# Patient Record
Sex: FEMALE | Race: OTHER
[De-identification: names, ages, dates, MRNs, and addresses within clinical notes are randomized per-mention and may not be internally consistent; named-entity substitution may affect disease eponyms.]

---

## 2019-07-22 ENCOUNTER — HOSPITAL ENCOUNTER (INPATIENT)
Dept: HOSPITAL 15 - ER | Age: 39
LOS: 3 days | Discharge: HOME | DRG: 243 | End: 2019-07-25
Attending: INTERNAL MEDICINE | Admitting: NURSE PRACTITIONER
Payer: SELF-PAY

## 2019-07-22 VITALS — WEIGHT: 176.15 LBS | HEIGHT: 63 IN | BODY MASS INDEX: 31.21 KG/M2

## 2019-07-22 DIAGNOSIS — I44.2: Primary | ICD-10-CM

## 2019-07-22 DIAGNOSIS — N12: ICD-10-CM

## 2019-07-22 DIAGNOSIS — Z82.49: ICD-10-CM

## 2019-07-22 DIAGNOSIS — I08.0: ICD-10-CM

## 2019-07-22 LAB
ALBUMIN SERPL-MCNC: 3.4 G/DL (ref 3.4–5)
ALCOHOL, URINE: < 3 MG/DL (ref 0–5)
ALP SERPL-CCNC: 49 U/L (ref 45–117)
ALT SERPL-CCNC: 19 U/L (ref 13–56)
AMPHETAMINES UR QL SCN: NEGATIVE
ANION GAP SERPL CALCULATED.3IONS-SCNC: 8 MMOL/L (ref 5–15)
APTT PPP: 24.8 SEC (ref 23.64–32.05)
BARBITURATES UR QL SCN: NEGATIVE
BENZODIAZ UR QL SCN: NEGATIVE
BILIRUB SERPL-MCNC: 0.4 MG/DL (ref 0.2–1)
BUN SERPL-MCNC: 21 MG/DL (ref 7–18)
BUN/CREAT SERPL: 24.4
BZE UR QL SCN: NEGATIVE
CALCIUM SERPL-MCNC: 7.9 MG/DL (ref 8.5–10.1)
CANNABINOIDS UR QL SCN: NEGATIVE
CHLORIDE SERPL-SCNC: 110 MMOL/L (ref 98–107)
CO2 SERPL-SCNC: 23 MMOL/L (ref 21–32)
GLUCOSE SERPL-MCNC: 92 MG/DL (ref 74–106)
HCT VFR BLD AUTO: 39.8 % (ref 36–46)
HGB BLD-MCNC: 13.3 G/DL (ref 12.2–16.2)
INR PPP: 1.04 (ref 0.9–1.15)
MCH RBC QN AUTO: 32.3 PG (ref 28–32)
MCV RBC AUTO: 96.6 FL (ref 80–100)
NRBC BLD QL AUTO: 0.3 %
OPIATES UR QL SCN: NEGATIVE
PCP UR QL SCN: NEGATIVE
POTASSIUM SERPL-SCNC: 3.7 MMOL/L (ref 3.5–5.1)
PROT SERPL-MCNC: 6.7 G/DL (ref 6.4–8.2)
SODIUM SERPL-SCNC: 141 MMOL/L (ref 136–145)

## 2019-07-22 PROCEDURE — 94761 N-INVAS EAR/PLS OXIMETRY MLT: CPT

## 2019-07-22 PROCEDURE — 74176 CT ABD & PELVIS W/O CONTRAST: CPT

## 2019-07-22 PROCEDURE — 71045 X-RAY EXAM CHEST 1 VIEW: CPT

## 2019-07-22 PROCEDURE — 87088 URINE BACTERIA CULTURE: CPT

## 2019-07-22 PROCEDURE — 85025 COMPLETE CBC W/AUTO DIFF WBC: CPT

## 2019-07-22 PROCEDURE — 84702 CHORIONIC GONADOTROPIN TEST: CPT

## 2019-07-22 PROCEDURE — 96366 THER/PROPH/DIAG IV INF ADDON: CPT

## 2019-07-22 PROCEDURE — 80048 BASIC METABOLIC PNL TOTAL CA: CPT

## 2019-07-22 PROCEDURE — 83605 ASSAY OF LACTIC ACID: CPT

## 2019-07-22 PROCEDURE — 93306 TTE W/DOPPLER COMPLETE: CPT

## 2019-07-22 PROCEDURE — 84443 ASSAY THYROID STIM HORMONE: CPT

## 2019-07-22 PROCEDURE — 85610 PROTHROMBIN TIME: CPT

## 2019-07-22 PROCEDURE — 80307 DRUG TEST PRSMV CHEM ANLYZR: CPT

## 2019-07-22 PROCEDURE — 51702 INSERT TEMP BLADDER CATH: CPT

## 2019-07-22 PROCEDURE — 84484 ASSAY OF TROPONIN QUANT: CPT

## 2019-07-22 PROCEDURE — 83735 ASSAY OF MAGNESIUM: CPT

## 2019-07-22 PROCEDURE — 82533 TOTAL CORTISOL: CPT

## 2019-07-22 PROCEDURE — 84100 ASSAY OF PHOSPHORUS: CPT

## 2019-07-22 PROCEDURE — 81025 URINE PREGNANCY TEST: CPT

## 2019-07-22 PROCEDURE — 87081 CULTURE SCREEN ONLY: CPT

## 2019-07-22 PROCEDURE — 87186 SC STD MICRODIL/AGAR DIL: CPT

## 2019-07-22 PROCEDURE — 87040 BLOOD CULTURE FOR BACTERIA: CPT

## 2019-07-22 PROCEDURE — 36415 COLL VENOUS BLD VENIPUNCTURE: CPT

## 2019-07-22 PROCEDURE — 80053 COMPREHEN METABOLIC PANEL: CPT

## 2019-07-22 PROCEDURE — 93005 ELECTROCARDIOGRAM TRACING: CPT

## 2019-07-22 PROCEDURE — 84439 ASSAY OF FREE THYROXINE: CPT

## 2019-07-22 PROCEDURE — 85730 THROMBOPLASTIN TIME PARTIAL: CPT

## 2019-07-22 PROCEDURE — 96365 THER/PROPH/DIAG IV INF INIT: CPT

## 2019-07-22 PROCEDURE — 81001 URINALYSIS AUTO W/SCOPE: CPT

## 2019-07-22 PROCEDURE — 87086 URINE CULTURE/COLONY COUNT: CPT

## 2019-07-22 RX ADMIN — SODIUM CHLORIDE SCH MLS/HR: 0.9 INJECTION, SOLUTION INTRAVENOUS at 20:16

## 2019-07-22 RX ADMIN — ACETAMINOPHEN PRN MG: 500 TABLET ORAL at 20:00

## 2019-07-22 RX ADMIN — DOPAMINE HYDROCHLORIDE IN DEXTROSE SCH MLS/HR: 1.6 INJECTION, SOLUTION INTRAVENOUS at 18:52

## 2019-07-22 RX ADMIN — DOPAMINE HYDROCHLORIDE IN DEXTROSE SCH MLS/HR: 1.6 INJECTION, SOLUTION INTRAVENOUS at 08:00

## 2019-07-22 RX ADMIN — ACETAMINOPHEN PRN MG: 500 TABLET ORAL at 14:15

## 2019-07-22 RX ADMIN — SODIUM CHLORIDE SCH MLS/HR: 0.9 INJECTION, SOLUTION INTRAVENOUS at 08:19

## 2019-07-22 RX ADMIN — SODIUM CHLORIDE SCH MLS/HR: 0.9 INJECTION, SOLUTION INTRAVENOUS at 18:52

## 2019-07-23 VITALS — SYSTOLIC BLOOD PRESSURE: 109 MMHG | DIASTOLIC BLOOD PRESSURE: 51 MMHG

## 2019-07-23 LAB
ANION GAP SERPL CALCULATED.3IONS-SCNC: 7 MMOL/L (ref 5–15)
BUN SERPL-MCNC: 16 MG/DL (ref 7–18)
BUN/CREAT SERPL: 21.9
CALCIUM SERPL-MCNC: 8 MG/DL (ref 8.5–10.1)
CHLORIDE SERPL-SCNC: 113 MMOL/L (ref 98–107)
CO2 SERPL-SCNC: 24 MMOL/L (ref 21–32)
GLUCOSE SERPL-MCNC: 122 MG/DL (ref 74–106)
HCT VFR BLD AUTO: 42.7 % (ref 36–46)
HGB BLD-MCNC: 14.3 G/DL (ref 12.2–16.2)
MCH RBC QN AUTO: 32.4 PG (ref 28–32)
MCV RBC AUTO: 96.5 FL (ref 80–100)
NRBC BLD QL AUTO: 0.1 %
POTASSIUM SERPL-SCNC: 3.9 MMOL/L (ref 3.5–5.1)
SODIUM SERPL-SCNC: 144 MMOL/L (ref 136–145)

## 2019-07-23 RX ADMIN — SODIUM CHLORIDE SCH MLS/HR: 0.9 INJECTION, SOLUTION INTRAVENOUS at 14:04

## 2019-07-23 RX ADMIN — CEFTRIAXONE SODIUM SCH MLS/HR: 1 INJECTION, POWDER, FOR SOLUTION INTRAMUSCULAR; INTRAVENOUS at 21:00

## 2019-07-23 RX ADMIN — CEFTRIAXONE SODIUM SCH MLS/HR: 1 INJECTION, POWDER, FOR SOLUTION INTRAMUSCULAR; INTRAVENOUS at 09:07

## 2019-07-23 RX ADMIN — DOPAMINE HYDROCHLORIDE IN DEXTROSE SCH MLS/HR: 1.6 INJECTION, SOLUTION INTRAVENOUS at 19:14

## 2019-07-23 NOTE — NUR
Admit to [facility]

STEPH BAKER admitted to [facility] via ER BED on cardiac monitor. Patient connected to unit 
monitoring and oxygen, and weighed by bedscale. Patient oriented to DALLAS MARINELLI, 
primary RN, unit, room, bed, and unit policies regarding patient care and visiting hours.  
All questions and concerns addressed, patient verbalized understanding.

## 2019-07-24 VITALS — SYSTOLIC BLOOD PRESSURE: 100 MMHG | DIASTOLIC BLOOD PRESSURE: 53 MMHG

## 2019-07-24 VITALS — DIASTOLIC BLOOD PRESSURE: 52 MMHG | SYSTOLIC BLOOD PRESSURE: 106 MMHG

## 2019-07-24 VITALS — SYSTOLIC BLOOD PRESSURE: 108 MMHG | DIASTOLIC BLOOD PRESSURE: 63 MMHG

## 2019-07-24 VITALS — SYSTOLIC BLOOD PRESSURE: 114 MMHG | DIASTOLIC BLOOD PRESSURE: 60 MMHG

## 2019-07-24 VITALS — SYSTOLIC BLOOD PRESSURE: 101 MMHG | DIASTOLIC BLOOD PRESSURE: 44 MMHG

## 2019-07-24 VITALS — SYSTOLIC BLOOD PRESSURE: 113 MMHG | DIASTOLIC BLOOD PRESSURE: 54 MMHG

## 2019-07-24 LAB
ANION GAP SERPL CALCULATED.3IONS-SCNC: 8 MMOL/L (ref 5–15)
BUN SERPL-MCNC: 16 MG/DL (ref 7–18)
BUN/CREAT SERPL: 22.9
CALCIUM SERPL-MCNC: 7.7 MG/DL (ref 8.5–10.1)
CHLORIDE SERPL-SCNC: 114 MMOL/L (ref 98–107)
CO2 SERPL-SCNC: 22 MMOL/L (ref 21–32)
GLUCOSE SERPL-MCNC: 79 MG/DL (ref 74–106)
MAGNESIUM SERPL-MCNC: 2.2 MG/DL (ref 1.6–2.6)
POTASSIUM SERPL-SCNC: 3.5 MMOL/L (ref 3.5–5.1)
SODIUM SERPL-SCNC: 144 MMOL/L (ref 136–145)

## 2019-07-24 PROCEDURE — 0JH606Z INSERTION OF PACEMAKER, DUAL CHAMBER INTO CHEST SUBCUTANEOUS TISSUE AND FASCIA, OPEN APPROACH: ICD-10-PCS | Performed by: INTERNAL MEDICINE

## 2019-07-24 PROCEDURE — B5171ZZ FLUOROSCOPY OF LEFT SUBCLAVIAN VEIN USING LOW OSMOLAR CONTRAST: ICD-10-PCS | Performed by: INTERNAL MEDICINE

## 2019-07-24 PROCEDURE — 02HK3JZ INSERTION OF PACEMAKER LEAD INTO RIGHT VENTRICLE, PERCUTANEOUS APPROACH: ICD-10-PCS | Performed by: INTERNAL MEDICINE

## 2019-07-24 PROCEDURE — 02H63JZ INSERTION OF PACEMAKER LEAD INTO RIGHT ATRIUM, PERCUTANEOUS APPROACH: ICD-10-PCS | Performed by: INTERNAL MEDICINE

## 2019-07-24 RX ADMIN — CEFTRIAXONE SODIUM SCH MLS/HR: 1 INJECTION, POWDER, FOR SOLUTION INTRAMUSCULAR; INTRAVENOUS at 09:00

## 2019-07-24 RX ADMIN — ACETAMINOPHEN PRN MG: 500 TABLET ORAL at 10:50

## 2019-07-24 RX ADMIN — HYDROCODONE BITARTRATE AND ACETAMINOPHEN PRN TAB: 5; 325 TABLET ORAL at 18:41

## 2019-07-24 RX ADMIN — SODIUM CHLORIDE SCH MLS/HR: 0.9 INJECTION, SOLUTION INTRAVENOUS at 09:00

## 2019-07-24 RX ADMIN — DOPAMINE HYDROCHLORIDE IN DEXTROSE SCH MLS/HR: 1.6 INJECTION, SOLUTION INTRAVENOUS at 12:51

## 2019-07-24 RX ADMIN — SODIUM CHLORIDE SCH MLS/HR: 0.9 INJECTION, SOLUTION INTRAVENOUS at 00:05

## 2019-07-24 RX ADMIN — CEFTRIAXONE SODIUM SCH MLS/HR: 1 INJECTION, POWDER, FOR SOLUTION INTRAMUSCULAR; INTRAVENOUS at 21:15

## 2019-07-24 RX ADMIN — HYDROCODONE BITARTRATE AND ACETAMINOPHEN PRN TAB: 5; 325 TABLET ORAL at 23:00

## 2019-07-24 RX ADMIN — HYDROCODONE BITARTRATE AND ACETAMINOPHEN PRN TAB: 5; 325 TABLET ORAL at 14:43

## 2019-07-24 NOTE — NUR
PATIENT SEMI FOWLERS IN BED, O2 BY R/A,  A/O TIMES 4, ASKING  FOR PAIN MEDICATIONS EXPRESS 
TO HER THAT I WILL GIVE IT WHEN IT IS DUE, TRIED TO  FLUSH THE SALINE LOCK IN THE LFA AND 
PATIENT YELLED AND TOLD ME TO STOP THAT IT HURT TOO MUCH PLACED ICE PACK TO THE  ARM, STOP 
THE NS THAT WAS RUNNING IN THE RAC CHANGED TO SALINE LOCK, O2 BY R/A ,DRESSING TO THE LEFT 
SHOULDER WITH THE SAME SPOT OF BLOOD AND ICE PACK TO THE SITE, WILL CONTINUE TO MONITOR AND 
GIVER REPORT TO THE NEXT SHIFT

## 2019-07-24 NOTE — NUR
back from the cath lab after  pacemaker placed to the left shoulder, dressing with a small  
amount of blood

## 2019-07-24 NOTE — NUR
STATES SHE IS STARTING TO FEEL BETTER AND DRANK SOME JUICE, RESTATED THE IV FLUIDS AND 
STATING NOT HURTING

## 2019-07-24 NOTE — NUR
MORNING CARE 

PATIENT PERFORMED MORNING CARE ON SELF WITH CHG WIPES AND WASH CLOTHS TO THE FACE. FULL 
LINEN CHANGE AND GOWN CHANGED. SKIN REASSESSED AT THIS TIME. BED IN LOWEST POSITION, SIDE 
RAILS UP X2, CALL LIGHT WITHIN REACH. WILL CONTINUE TO MONITOR.

## 2019-07-24 NOTE — NUR
IV REMOVAL

PATIENT COMPLAINING OF BURNING AND PAIN DURING FLUSH ON LEFT FOREARM IV. IV REMOVED, 
CATHETER FULLY INTACT. PATIENT TOLERATED WELL.

## 2019-07-24 NOTE — NUR
RECEIVED PATIENT AWAKE A/O TIMES 4, O2 BY R/A, STATES SHE GOES TO THE BR, NS INFUSING INTO 
THE RAC AT 100ML/HR BY THE IV PUMP, DENIES CHEST PAIN AND SOB, STATED THAT SHE WAS GOING TO 
HAVE A PACEMAKER PLACED TODAY, INFORMED HER THAT I WOULD CALL AND SEE WHAT TIME

## 2019-07-24 NOTE — NUR
SPOKE WITH DR STARR AND STATED TO TELL THE PATIENT THAT SHE WOULD NOT BE HAVING PACEMAKER 
PLACED TODAY THE CATH LAB IS FULL, MADE PATIETN AWARE

## 2019-07-24 NOTE — NUR
PATIENT STATES SHE HAS A FUNNY FEELING AND HER ARM IS HURTING, POTASSIUM RATE DECREASED TO 
HALF  15ML/HR

## 2019-07-24 NOTE — NUR
END OF SHIFT 

PATIENT IN BED SLEEPING WITH NO SIGNS OR [id number] OF SOB, PAIN OR DISTRESS. CURRENTLY ON 
ROOM AIR, 02 SAT - 99%. RIGHT ANTECUBITAL IV - CLEAN/DRY/INTACT. BED IN LOWEST POSITION, 
SIDE RAILS UP X2, CALL LIGHT WITHIN REACH. WILL ENDORSE CARE TO DAY SHIFT RN.

## 2019-07-24 NOTE — NUR
STATES ARM IS STILL HURTING AND SHE FEELING LIKE SHE IS GOING TO  PASS OUT, FLUSHED THE SITE 
AND STOPPED THE POTASSIUM, EXPRESS TO HER TO TRY AND RELAX

## 2019-07-24 NOTE — NUR
OPENING SHIFT

RECEIVED REPORT FROM DAY SHIFT RN. ASSUMED CARE OF PATIENT. PATIENT IN BED RESTING RESTING 
WITH NO SIGNS OR SYMPTOMS OF SOB, PAIN OR DISTRESS. CURRENTLY ON ROOM AIR, 02 SAT - 99%. 
LEFT UPPER CHEST S/P PERMANENT PACEMAKER DRESSING - CLEAN/DRY/INTACT. RIGHT AND LEFT FOREARM 
IV - CLEAN/DRY/INTACT. REPOSITIONED FOR COMFORT. UPDATED PATIENT ON PLAN OF CARE. BED IN 
LOWEST POSITION, SIDE RAILS UP X2, CALL LIGHT WITHIN REACH. WILL CONTINUE TO MONITOR.

## 2019-07-25 VITALS — DIASTOLIC BLOOD PRESSURE: 59 MMHG | SYSTOLIC BLOOD PRESSURE: 111 MMHG

## 2019-07-25 VITALS — DIASTOLIC BLOOD PRESSURE: 57 MMHG | SYSTOLIC BLOOD PRESSURE: 98 MMHG

## 2019-07-25 VITALS — SYSTOLIC BLOOD PRESSURE: 105 MMHG | DIASTOLIC BLOOD PRESSURE: 60 MMHG

## 2019-07-25 VITALS — DIASTOLIC BLOOD PRESSURE: 66 MMHG | SYSTOLIC BLOOD PRESSURE: 104 MMHG

## 2019-07-25 VITALS — SYSTOLIC BLOOD PRESSURE: 103 MMHG | DIASTOLIC BLOOD PRESSURE: 53 MMHG

## 2019-07-25 VITALS — SYSTOLIC BLOOD PRESSURE: 111 MMHG | DIASTOLIC BLOOD PRESSURE: 59 MMHG

## 2019-07-25 LAB
ANION GAP SERPL CALCULATED.3IONS-SCNC: 12 MMOL/L (ref 5–15)
BUN SERPL-MCNC: 11 MG/DL (ref 7–18)
BUN/CREAT SERPL: 17.2
CALCIUM SERPL-MCNC: 7.7 MG/DL (ref 8.5–10.1)
CHLORIDE SERPL-SCNC: 113 MMOL/L (ref 98–107)
CO2 SERPL-SCNC: 19 MMOL/L (ref 21–32)
GLUCOSE SERPL-MCNC: 62 MG/DL (ref 74–106)
POTASSIUM SERPL-SCNC: 4.7 MMOL/L (ref 3.5–5.1)
SODIUM SERPL-SCNC: 144 MMOL/L (ref 136–145)

## 2019-07-25 RX ADMIN — CEFTRIAXONE SODIUM SCH MLS/HR: 1 INJECTION, POWDER, FOR SOLUTION INTRAMUSCULAR; INTRAVENOUS at 08:27

## 2019-07-25 RX ADMIN — HYDROCODONE BITARTRATE AND ACETAMINOPHEN PRN TAB: 5; 325 TABLET ORAL at 08:27

## 2019-07-25 RX ADMIN — HYDROCODONE BITARTRATE AND ACETAMINOPHEN PRN TAB: 5; 325 TABLET ORAL at 12:50

## 2019-07-25 RX ADMIN — HYDROCODONE BITARTRATE AND ACETAMINOPHEN PRN TAB: 5; 325 TABLET ORAL at 03:56

## 2019-07-25 RX ADMIN — ACETAMINOPHEN PRN MG: 500 TABLET ORAL at 16:20

## 2019-07-25 NOTE — NUR
END OF SHIFT

PATIENT IN BED RESTING RESTING WITH NO SIGNS OR SYMPTOMS OF SOB, PAIN OR DISTRESS. CURRENTLY 
ON ROOM AIR, 02 SAT - 99%. LEFT UPPER CHEST S/P PERMANENT PACEMAKER DRESSING - 
CLEAN/DRY/INTACT. RIGHT FOREARM IV - CLEAN/DRY/INTACT. REPOSITIONED FOR COMFORT. BED IN 
LOWEST POSITION, SIDE RAILS UP X2, CALL LIGHT WITHIN REACH. WILL ENDORSE CARE TO DAY SHIFT 
RN.

## 2019-07-25 NOTE — NUR
MOM HERE TO TAKE PATIENT HOME,  SALINE LOCK REMOVED FROM THE RAC 20G, INTACT, SLING TO THE 
LEFT ARM AND SMALL AMOUNT OF BLOOD TO THE CHEST DRESSING, REMINDED THE PATIENT NOT TO RAISE 
THE ARM OVER HER HEAD AND NOT TO  LIFT ANY THING HEAVY,    AND TO KEEP DRESSING DRY,  TAKEN 
TO  PRIVATE CAR FOR TRANSPORT HOME

## 2019-07-25 NOTE — NUR
PAGED SUMMER LOPEZ TO COME AND SEE THE PATIENT STATES SHE IS HAVING A FLUTTERY LIKE FEELING IN 
HER CHEST

## 2019-07-25 NOTE — NUR
SITTING UP IN THE BED TALKING ON THE CELL PHONE, MOTHER AT THE BEDSIDE, SLING TO THE LEFT 
ARM, AND ICE PACK

## 2019-07-25 NOTE — NUR
DR MIDDLETON IN TO SE THE PATIENT AND STATED SHE CAN GO HOME

-------------------------------------------------------------------------------

Addendum: 07/25/19 at 1415 by Jena Fitzpatrick RN

-------------------------------------------------------------------------------

CHANGE TIME TO 6271

## 2019-07-25 NOTE — NUR
RECEIVED PATIENT SITTING UP IN TH BED A/O TIMES4. O2 BY R/A, STATES SHE GOT UP TOT HE BSC 
DURING THE NIGHT, SALINE LOCK TO THE RAC 20G FLUSHED AND PATENT, DRESSING TO THE LEFT 
SHOULDER WITH SMALL AMOUNT OF DRIED BLOOD, AND ICE PACK, SLING TO THE LEFT ARM  DUE TO 
PACEMAKER INSERTION YESTERDAY

## 2019-07-25 NOTE — NUR
D/C INSTRUCTIONS GIVEN TO THE  PATIENT ALONG WITH PRESCRIPTION, EXPRESS TO THE PATIENT THAT 
THEY WILL SENT  A MACHINE TO HER HOUSE IN A FEW DAYS THAT SHE WILL CONNECT HER PACEMAKER TO 
AT NIGHT, VERBALIZED THAT SHE UNDERSTOOD

## 2019-07-26 ENCOUNTER — HOSPITAL ENCOUNTER (EMERGENCY)
Dept: HOSPITAL 15 - ER | Age: 39
Discharge: HOME | End: 2019-07-26
Payer: SELF-PAY

## 2019-07-26 VITALS — DIASTOLIC BLOOD PRESSURE: 60 MMHG | SYSTOLIC BLOOD PRESSURE: 112 MMHG

## 2019-07-26 VITALS — WEIGHT: 168 LBS | HEIGHT: 63 IN | BODY MASS INDEX: 29.77 KG/M2

## 2019-07-26 DIAGNOSIS — R55: Primary | ICD-10-CM

## 2019-07-26 DIAGNOSIS — Z95.0: ICD-10-CM

## 2019-07-26 LAB
ALBUMIN SERPL-MCNC: 3.7 G/DL (ref 3.4–5)
ALP SERPL-CCNC: 65 U/L (ref 45–117)
ALT SERPL-CCNC: 27 U/L (ref 13–56)
ANION GAP SERPL CALCULATED.3IONS-SCNC: 8 MMOL/L (ref 5–15)
BILIRUB SERPL-MCNC: 0.6 MG/DL (ref 0.2–1)
BUN SERPL-MCNC: 11 MG/DL (ref 7–18)
BUN/CREAT SERPL: 13.8
CALCIUM SERPL-MCNC: 8.7 MG/DL (ref 8.5–10.1)
CHLORIDE SERPL-SCNC: 107 MMOL/L (ref 98–107)
CO2 SERPL-SCNC: 26 MMOL/L (ref 21–32)
GLUCOSE SERPL-MCNC: 93 MG/DL (ref 74–106)
HCT VFR BLD AUTO: 46.5 % (ref 36–46)
HGB BLD-MCNC: 15.8 G/DL (ref 12.2–16.2)
MCH RBC QN AUTO: 32.5 PG (ref 28–32)
MCV RBC AUTO: 95.8 FL (ref 80–100)
NRBC BLD QL AUTO: 0.1 %
POTASSIUM SERPL-SCNC: 4.1 MMOL/L (ref 3.5–5.1)
PROT SERPL-MCNC: 7.7 G/DL (ref 6.4–8.2)
SODIUM SERPL-SCNC: 141 MMOL/L (ref 136–145)

## 2019-07-26 PROCEDURE — 85025 COMPLETE CBC W/AUTO DIFF WBC: CPT

## 2019-07-26 PROCEDURE — 80053 COMPREHEN METABOLIC PANEL: CPT

## 2019-07-26 PROCEDURE — 93005 ELECTROCARDIOGRAM TRACING: CPT

## 2019-07-26 PROCEDURE — 70450 CT HEAD/BRAIN W/O DYE: CPT

## 2019-07-26 PROCEDURE — 36415 COLL VENOUS BLD VENIPUNCTURE: CPT

## 2019-07-26 PROCEDURE — 94761 N-INVAS EAR/PLS OXIMETRY MLT: CPT

## 2019-07-26 PROCEDURE — 84484 ASSAY OF TROPONIN QUANT: CPT

## 2021-08-03 ENCOUNTER — HOSPITAL ENCOUNTER (EMERGENCY)
Dept: HOSPITAL 15 - ER | Age: 41
Discharge: HOME | End: 2021-08-03
Payer: COMMERCIAL

## 2021-08-03 VITALS — BODY MASS INDEX: 31.89 KG/M2 | WEIGHT: 180 LBS | HEIGHT: 63 IN

## 2021-08-03 VITALS — SYSTOLIC BLOOD PRESSURE: 130 MMHG | DIASTOLIC BLOOD PRESSURE: 77 MMHG

## 2021-08-03 DIAGNOSIS — J18.9: ICD-10-CM

## 2021-08-03 DIAGNOSIS — Z20.822: ICD-10-CM

## 2021-08-03 DIAGNOSIS — Z95.0: ICD-10-CM

## 2021-08-03 DIAGNOSIS — J20.9: Primary | ICD-10-CM

## 2021-08-03 DIAGNOSIS — Z88.6: ICD-10-CM

## 2021-08-03 LAB
ALBUMIN SERPL-MCNC: 3.6 G/DL (ref 3.4–5)
ALP SERPL-CCNC: 83 U/L (ref 45–117)
ALT SERPL-CCNC: 29 U/L (ref 13–56)
ANION GAP SERPL CALCULATED.3IONS-SCNC: 5 MMOL/L (ref 5–15)
BILIRUB SERPL-MCNC: 0.3 MG/DL (ref 0.2–1)
BUN SERPL-MCNC: 11 MG/DL (ref 7–18)
BUN/CREAT SERPL: 15.1
CALCIUM SERPL-MCNC: 8.5 MG/DL (ref 8.5–10.1)
CHLORIDE SERPL-SCNC: 109 MMOL/L (ref 98–107)
CO2 SERPL-SCNC: 25 MMOL/L (ref 21–32)
GLUCOSE SERPL-MCNC: 91 MG/DL (ref 74–106)
HCT VFR BLD AUTO: 47 % (ref 36–46)
HGB BLD-MCNC: 16.4 G/DL (ref 12.2–16.2)
MCH RBC QN AUTO: 31.6 PG (ref 28–32)
MCV RBC AUTO: 90.8 FL (ref 80–100)
NRBC BLD QL AUTO: 0.1 %
POTASSIUM SERPL-SCNC: 3.6 MMOL/L (ref 3.5–5.1)
PROT SERPL-MCNC: 8.7 G/DL (ref 6.4–8.2)
SODIUM SERPL-SCNC: 139 MMOL/L (ref 136–145)

## 2021-08-03 PROCEDURE — 87426 SARSCOV CORONAVIRUS AG IA: CPT

## 2021-08-03 PROCEDURE — 85025 COMPLETE CBC W/AUTO DIFF WBC: CPT

## 2021-08-03 PROCEDURE — 85379 FIBRIN DEGRADATION QUANT: CPT

## 2021-08-03 PROCEDURE — 86141 C-REACTIVE PROTEIN HS: CPT

## 2021-08-03 PROCEDURE — 99285 EMERGENCY DEPT VISIT HI MDM: CPT

## 2021-08-03 PROCEDURE — 83605 ASSAY OF LACTIC ACID: CPT

## 2021-08-03 PROCEDURE — 87040 BLOOD CULTURE FOR BACTERIA: CPT

## 2021-08-03 PROCEDURE — 82728 ASSAY OF FERRITIN: CPT

## 2021-08-03 PROCEDURE — 80053 COMPREHEN METABOLIC PANEL: CPT

## 2021-08-03 PROCEDURE — 71275 CT ANGIOGRAPHY CHEST: CPT

## 2021-08-03 PROCEDURE — 36415 COLL VENOUS BLD VENIPUNCTURE: CPT

## 2021-08-03 PROCEDURE — 71045 X-RAY EXAM CHEST 1 VIEW: CPT

## 2022-07-23 ENCOUNTER — HOSPITAL ENCOUNTER (EMERGENCY)
Dept: HOSPITAL 15 - ER | Age: 42
Discharge: HOME | End: 2022-07-23
Payer: COMMERCIAL

## 2022-07-23 VITALS — WEIGHT: 160.94 LBS | BODY MASS INDEX: 25.86 KG/M2 | HEIGHT: 66 IN

## 2022-07-23 VITALS — DIASTOLIC BLOOD PRESSURE: 81 MMHG | SYSTOLIC BLOOD PRESSURE: 120 MMHG

## 2022-07-23 DIAGNOSIS — Z95.0: ICD-10-CM

## 2022-07-23 DIAGNOSIS — N83.202: Primary | ICD-10-CM

## 2022-07-23 LAB
ALBUMIN SERPL-MCNC: 4.3 G/DL (ref 3.4–5)
ALP SERPL-CCNC: 67 U/L (ref 45–117)
ALT SERPL-CCNC: 22 U/L (ref 13–56)
ANION GAP SERPL CALCULATED.3IONS-SCNC: 8 MMOL/L (ref 5–15)
BILIRUB SERPL-MCNC: 0.4 MG/DL (ref 0.2–1)
BUN SERPL-MCNC: 12 MG/DL (ref 7–18)
BUN/CREAT SERPL: 15.8
CALCIUM SERPL-MCNC: 8.5 MG/DL (ref 8.5–10.1)
CHLORIDE SERPL-SCNC: 108 MMOL/L (ref 98–107)
CO2 SERPL-SCNC: 25 MMOL/L (ref 21–32)
GLUCOSE SERPL-MCNC: 95 MG/DL (ref 74–106)
HCT VFR BLD AUTO: 42.4 % (ref 36–46)
HGB BLD-MCNC: 14.2 G/DL (ref 12.2–16.2)
MAGNESIUM SERPL-MCNC: 2.1 MG/DL (ref 1.6–2.6)
MCH RBC QN AUTO: 30.6 PG (ref 28–32)
MCV RBC AUTO: 91.1 FL (ref 80–100)
NRBC BLD QL AUTO: 0.2 %
POTASSIUM SERPL-SCNC: 4.3 MMOL/L (ref 3.5–5.1)
PROT SERPL-MCNC: 7.7 G/DL (ref 6.4–8.2)
SODIUM SERPL-SCNC: 141 MMOL/L (ref 136–145)

## 2022-07-23 PROCEDURE — 80053 COMPREHEN METABOLIC PANEL: CPT

## 2022-07-23 PROCEDURE — 93005 ELECTROCARDIOGRAM TRACING: CPT

## 2022-07-23 PROCEDURE — 83735 ASSAY OF MAGNESIUM: CPT

## 2022-07-23 PROCEDURE — 74176 CT ABD & PELVIS W/O CONTRAST: CPT

## 2022-07-23 PROCEDURE — 84484 ASSAY OF TROPONIN QUANT: CPT

## 2022-07-23 PROCEDURE — 84702 CHORIONIC GONADOTROPIN TEST: CPT

## 2022-07-23 PROCEDURE — 36415 COLL VENOUS BLD VENIPUNCTURE: CPT

## 2022-07-23 PROCEDURE — 81001 URINALYSIS AUTO W/SCOPE: CPT

## 2022-07-23 PROCEDURE — 85025 COMPLETE CBC W/AUTO DIFF WBC: CPT

## 2022-09-22 ENCOUNTER — HOSPITAL ENCOUNTER (EMERGENCY)
Dept: HOSPITAL 15 - ER | Age: 42
Discharge: HOME | End: 2022-09-22
Payer: COMMERCIAL

## 2022-09-22 VITALS — HEIGHT: 63 IN | BODY MASS INDEX: 33.83 KG/M2 | WEIGHT: 190.92 LBS

## 2022-09-22 VITALS — SYSTOLIC BLOOD PRESSURE: 117 MMHG | DIASTOLIC BLOOD PRESSURE: 78 MMHG

## 2022-09-22 DIAGNOSIS — Z95.0: ICD-10-CM

## 2022-09-22 DIAGNOSIS — J03.90: ICD-10-CM

## 2022-09-22 DIAGNOSIS — K21.9: Primary | ICD-10-CM

## 2022-09-22 DIAGNOSIS — Z88.8: ICD-10-CM

## 2022-09-22 PROCEDURE — 93005 ELECTROCARDIOGRAM TRACING: CPT

## 2022-12-22 ENCOUNTER — HOSPITAL ENCOUNTER (EMERGENCY)
Dept: HOSPITAL 15 - ER | Age: 42
Discharge: HOME | End: 2022-12-22
Payer: COMMERCIAL

## 2022-12-22 VITALS — HEIGHT: 63 IN | BODY MASS INDEX: 33.2 KG/M2 | WEIGHT: 187.39 LBS

## 2022-12-22 VITALS — DIASTOLIC BLOOD PRESSURE: 92 MMHG | SYSTOLIC BLOOD PRESSURE: 130 MMHG

## 2022-12-22 DIAGNOSIS — K21.9: Primary | ICD-10-CM

## 2022-12-22 DIAGNOSIS — Z79.2: ICD-10-CM

## 2022-12-22 DIAGNOSIS — Z79.899: ICD-10-CM

## 2022-12-22 DIAGNOSIS — Z88.8: ICD-10-CM

## 2022-12-22 DIAGNOSIS — K29.70: ICD-10-CM

## 2022-12-22 DIAGNOSIS — Z95.0: ICD-10-CM

## 2022-12-22 LAB
ALBUMIN SERPL-MCNC: 3.8 G/DL (ref 3.4–5)
ALP SERPL-CCNC: 79 U/L (ref 45–117)
ALT SERPL-CCNC: 24 U/L (ref 13–56)
ANION GAP SERPL CALCULATED.3IONS-SCNC: 7 MMOL/L (ref 5–15)
BILIRUB SERPL-MCNC: 0.3 MG/DL (ref 0.2–1)
BUN SERPL-MCNC: 13 MG/DL (ref 7–18)
BUN/CREAT SERPL: 19.4
CALCIUM SERPL-MCNC: 9.3 MG/DL (ref 8.5–10.1)
CHLORIDE SERPL-SCNC: 110 MMOL/L (ref 98–107)
CO2 SERPL-SCNC: 25 MMOL/L (ref 21–32)
GLUCOSE SERPL-MCNC: 108 MG/DL (ref 74–106)
HCT VFR BLD AUTO: 44.6 % (ref 36–46)
HGB BLD-MCNC: 15.2 G/DL (ref 12.2–16.2)
LIPASE SERPL-CCNC: 146 U/L (ref 73–393)
MCH RBC QN AUTO: 30.8 PG (ref 28–32)
MCV RBC AUTO: 90.4 FL (ref 80–100)
NRBC BLD QL AUTO: 0.1 %
POTASSIUM SERPL-SCNC: 4.1 MMOL/L (ref 3.5–5.1)
PROT SERPL-MCNC: 7.6 G/DL (ref 6.4–8.2)
SODIUM SERPL-SCNC: 142 MMOL/L (ref 136–145)

## 2022-12-22 PROCEDURE — 80053 COMPREHEN METABOLIC PANEL: CPT

## 2022-12-22 PROCEDURE — 81025 URINE PREGNANCY TEST: CPT

## 2022-12-22 PROCEDURE — 83690 ASSAY OF LIPASE: CPT

## 2022-12-22 PROCEDURE — 85025 COMPLETE CBC W/AUTO DIFF WBC: CPT

## 2022-12-22 PROCEDURE — 36415 COLL VENOUS BLD VENIPUNCTURE: CPT

## 2022-12-22 PROCEDURE — 81003 URINALYSIS AUTO W/O SCOPE: CPT

## 2022-12-22 PROCEDURE — 74176 CT ABD & PELVIS W/O CONTRAST: CPT

## 2023-03-12 ENCOUNTER — HOSPITAL ENCOUNTER (EMERGENCY)
Dept: HOSPITAL 15 - ER | Age: 43
Discharge: HOME | End: 2023-03-12
Payer: COMMERCIAL

## 2023-03-12 VITALS — SYSTOLIC BLOOD PRESSURE: 122 MMHG | DIASTOLIC BLOOD PRESSURE: 54 MMHG

## 2023-03-12 VITALS — HEIGHT: 63 IN | BODY MASS INDEX: 34.45 KG/M2 | WEIGHT: 194.45 LBS

## 2023-03-12 DIAGNOSIS — Z88.8: ICD-10-CM

## 2023-03-12 DIAGNOSIS — I10: ICD-10-CM

## 2023-03-12 DIAGNOSIS — O26.891: Primary | ICD-10-CM

## 2023-03-12 DIAGNOSIS — Z3A.01: ICD-10-CM

## 2023-03-12 DIAGNOSIS — R10.2: ICD-10-CM

## 2023-03-12 LAB
ALBUMIN SERPL-MCNC: 3.6 G/DL (ref 3.4–5)
ALP SERPL-CCNC: 61 U/L (ref 45–117)
ALT SERPL-CCNC: 31 U/L (ref 13–56)
ANION GAP SERPL CALCULATED.3IONS-SCNC: 5 MMOL/L (ref 5–15)
BILIRUB SERPL-MCNC: 0.6 MG/DL (ref 0.2–1)
BUN SERPL-MCNC: 10 MG/DL (ref 7–18)
BUN/CREAT SERPL: 16.4
CALCIUM SERPL-MCNC: 8.5 MG/DL (ref 8.5–10.1)
CHLORIDE SERPL-SCNC: 109 MMOL/L (ref 98–107)
CO2 SERPL-SCNC: 25 MMOL/L (ref 21–32)
GLUCOSE SERPL-MCNC: 95 MG/DL (ref 74–106)
HCT VFR BLD AUTO: 40.9 % (ref 36–46)
HGB BLD-MCNC: 14.1 G/DL (ref 12.2–16.2)
MCH RBC QN AUTO: 31.2 PG (ref 28–32)
MCV RBC AUTO: 90.8 FL (ref 80–100)
NRBC BLD QL AUTO: 0.1 %
POTASSIUM SERPL-SCNC: 3.7 MMOL/L (ref 3.5–5.1)
PROT SERPL-MCNC: 7.6 G/DL (ref 6.4–8.2)
SODIUM SERPL-SCNC: 139 MMOL/L (ref 136–145)

## 2023-03-12 PROCEDURE — 76817 TRANSVAGINAL US OBSTETRIC: CPT

## 2023-03-12 PROCEDURE — 84702 CHORIONIC GONADOTROPIN TEST: CPT

## 2023-03-12 PROCEDURE — 85025 COMPLETE CBC W/AUTO DIFF WBC: CPT

## 2023-03-12 PROCEDURE — 81025 URINE PREGNANCY TEST: CPT

## 2023-03-12 PROCEDURE — 36415 COLL VENOUS BLD VENIPUNCTURE: CPT

## 2023-03-12 PROCEDURE — 80053 COMPREHEN METABOLIC PANEL: CPT

## 2023-03-12 PROCEDURE — 81001 URINALYSIS AUTO W/SCOPE: CPT

## 2023-03-12 PROCEDURE — 76801 OB US < 14 WKS SINGLE FETUS: CPT

## 2023-04-13 ENCOUNTER — HOSPITAL ENCOUNTER (EMERGENCY)
Dept: HOSPITAL 15 - ER | Age: 43
LOS: 1 days | Discharge: HOME | End: 2023-04-14
Payer: COMMERCIAL

## 2023-04-13 VITALS — WEIGHT: 195.33 LBS | BODY MASS INDEX: 34.61 KG/M2 | HEIGHT: 63 IN

## 2023-04-13 VITALS — SYSTOLIC BLOOD PRESSURE: 142 MMHG | DIASTOLIC BLOOD PRESSURE: 78 MMHG

## 2023-04-13 DIAGNOSIS — O26.891: Primary | ICD-10-CM

## 2023-04-13 DIAGNOSIS — Z88.8: ICD-10-CM

## 2023-04-13 DIAGNOSIS — R10.2: ICD-10-CM

## 2023-04-13 DIAGNOSIS — I10: ICD-10-CM

## 2023-04-13 DIAGNOSIS — Z98.890: ICD-10-CM

## 2023-04-13 DIAGNOSIS — R00.2: ICD-10-CM

## 2023-04-13 DIAGNOSIS — Z3A.10: ICD-10-CM

## 2023-04-13 DIAGNOSIS — K21.9: ICD-10-CM

## 2023-04-13 LAB
ALBUMIN SERPL-MCNC: 3.6 G/DL (ref 3.4–5)
ALP SERPL-CCNC: 58 U/L (ref 45–117)
ALT SERPL-CCNC: 18 U/L (ref 13–56)
ANION GAP SERPL CALCULATED.3IONS-SCNC: 6 MMOL/L (ref 5–15)
BILIRUB SERPL-MCNC: 0.2 MG/DL (ref 0.2–1)
BUN SERPL-MCNC: 7 MG/DL (ref 7–18)
BUN/CREAT SERPL: 10.8 (ref 10–20)
CALCIUM SERPL-MCNC: 8.5 MG/DL (ref 8.5–10.1)
CHLORIDE SERPL-SCNC: 107 MMOL/L (ref 98–107)
CO2 SERPL-SCNC: 25 MMOL/L (ref 21–32)
GLUCOSE SERPL-MCNC: 114 MG/DL (ref 74–106)
HCT VFR BLD AUTO: 41 % (ref 36–46)
HGB BLD-MCNC: 14.2 G/DL (ref 12.2–16.2)
MCH RBC QN AUTO: 31.5 PG (ref 28–32)
MCV RBC AUTO: 91.1 FL (ref 80–100)
NRBC BLD QL AUTO: 0 %
POTASSIUM SERPL-SCNC: 3.3 MMOL/L (ref 3.5–5.1)
PROT SERPL-MCNC: 7.1 G/DL (ref 6.4–8.2)
SODIUM SERPL-SCNC: 138 MMOL/L (ref 136–145)

## 2023-04-13 PROCEDURE — 76801 OB US < 14 WKS SINGLE FETUS: CPT

## 2023-04-13 PROCEDURE — 36415 COLL VENOUS BLD VENIPUNCTURE: CPT

## 2023-04-13 PROCEDURE — 84702 CHORIONIC GONADOTROPIN TEST: CPT

## 2023-04-13 PROCEDURE — 80053 COMPREHEN METABOLIC PANEL: CPT

## 2023-04-13 PROCEDURE — 84484 ASSAY OF TROPONIN QUANT: CPT

## 2023-04-13 PROCEDURE — 85025 COMPLETE CBC W/AUTO DIFF WBC: CPT

## 2023-04-13 PROCEDURE — 93005 ELECTROCARDIOGRAM TRACING: CPT

## 2023-10-07 ENCOUNTER — HOSPITAL ENCOUNTER (OUTPATIENT)
Dept: HOSPITAL 15 - LDRP | Age: 43
Setting detail: OBSERVATION
Discharge: HOME | End: 2023-10-07
Attending: OBSTETRICS & GYNECOLOGY | Admitting: OBSTETRICS & GYNECOLOGY
Payer: COMMERCIAL

## 2023-10-07 DIAGNOSIS — Z3A.35: ICD-10-CM

## 2023-10-07 DIAGNOSIS — O36.8130: Primary | ICD-10-CM

## 2023-10-07 PROCEDURE — 94760 N-INVAS EAR/PLS OXIMETRY 1: CPT

## 2023-10-07 PROCEDURE — 59025 FETAL NON-STRESS TEST: CPT

## 2023-10-07 PROCEDURE — 76818 FETAL BIOPHYS PROFILE W/NST: CPT

## 2023-10-07 PROCEDURE — 81002 URINALYSIS NONAUTO W/O SCOPE: CPT

## 2025-04-09 ENCOUNTER — HOSPITAL ENCOUNTER (EMERGENCY)
Dept: HOSPITAL 15 - ER | Age: 45
Discharge: HOME | End: 2025-04-09
Payer: COMMERCIAL

## 2025-04-09 VITALS
HEART RATE: 75 BPM | TEMPERATURE: 97.3 F | DIASTOLIC BLOOD PRESSURE: 74 MMHG | RESPIRATION RATE: 18 BRPM | SYSTOLIC BLOOD PRESSURE: 146 MMHG | OXYGEN SATURATION: 98 %

## 2025-04-09 VITALS — WEIGHT: 209.44 LBS | BODY MASS INDEX: 37.11 KG/M2 | HEIGHT: 63 IN

## 2025-04-09 DIAGNOSIS — Z79.899: ICD-10-CM

## 2025-04-09 DIAGNOSIS — M23.92: Primary | ICD-10-CM

## 2025-04-09 DIAGNOSIS — Z88.1: ICD-10-CM

## 2025-04-09 DIAGNOSIS — Z95.0: ICD-10-CM

## 2025-04-09 DIAGNOSIS — I10: ICD-10-CM

## 2025-06-28 ENCOUNTER — HOSPITAL ENCOUNTER (EMERGENCY)
Dept: HOSPITAL 15 - ER | Age: 45
Discharge: HOME | End: 2025-06-28
Payer: MEDICAID

## 2025-06-28 VITALS — HEIGHT: 63 IN | BODY MASS INDEX: 37.19 KG/M2 | WEIGHT: 209.88 LBS

## 2025-06-28 VITALS
TEMPERATURE: 98.4 F | HEART RATE: 101 BPM | DIASTOLIC BLOOD PRESSURE: 94 MMHG | RESPIRATION RATE: 16 BRPM | SYSTOLIC BLOOD PRESSURE: 123 MMHG

## 2025-06-28 VITALS — OXYGEN SATURATION: 99 %

## 2025-06-28 DIAGNOSIS — T59.91XA: Primary | ICD-10-CM
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
## 2025-06-28 DIAGNOSIS — I10: ICD-10-CM

## 2025-06-28 LAB
ALBUMIN SERPL-MCNC: 4.5 G/DL (ref 3.2–4.8)
ALP SERPL-CCNC: 73 U/L (ref 46–116)
ALT SERPL-CCNC: 36 U/L (ref 7–40)
ANION GAP SERPL CALCULATED.3IONS-SCNC: 9 MMOL/L (ref 5–15)
AST SERPL-CCNC: 37 U/L (ref ?–34)
BASOPHILS # BLD AUTO: 0.1 10 ^3/UL (ref 0–0.2)
BASOPHILS NFR BLD AUTO: 0.7 % (ref 0–2)
BILIRUB SERPL-MCNC: 0.8 MG/DL (ref 0.2–1)
BUN SERPL-MCNC: 10 MG/DL (ref 9–23)
BUN/CREAT SERPL: 13 (ref 10–20)
CALCIUM SERPL-MCNC: 9.8 MG/DL (ref 8.7–10.4)
CHLORIDE SERPL-SCNC: 107 MMOL/L (ref 98–107)
CO2 SERPL-SCNC: 24 MMOL/L (ref 20–31)
CREAT SERPL-MCNC: 0.77 MG/DL (ref 0.55–1.02)
EOSINOPHIL # BLD AUTO: 0.2 10 ^3/UL (ref 0–0.8)
EOSINOPHIL NFR BLD AUTO: 3 % (ref 0–7)
ERYTHROCYTE [DISTWIDTH] IN BLOOD BY AUTOMATED COUNT: 13.1 % (ref 11.8–14.3)
GLUCOSE SERPL-MCNC: 97 MG/DL (ref 74–106)
HCT VFR BLD AUTO: 43.1 % (ref 36–46)
HGB BLD-MCNC: 14.8 G/DL (ref 12.2–16.2)
LYMPHOCYTES # BLD AUTO: 1.2 10 ^3/UL (ref 0.4–5.4)
LYMPHOCYTES NFR BLD AUTO: 15 % (ref 10–50)
MCH RBC QN AUTO: 31 PG (ref 28–32)
MCHC RBC AUTO-ENTMCNC: 34.4 G/DL (ref 32–36)
MCV RBC AUTO: 89.9 FL (ref 80–100)
MONOCYTES # BLD AUTO: 0.5 10 ^3/UL (ref 0–1.3)
MONOCYTES NFR BLD AUTO: 6.2 % (ref 0–12)
NEUTROPHILS # BLD AUTO: 6 10 ^3/UL (ref 1.6–8.6)
NEUTROPHILS NFR BLD AUTO: 75.1 % (ref 37–80)
NRBC BLD QL AUTO: 0.1 %
PLATELET # BLD AUTO: 213 10^3/UL (ref 140–450)
POTASSIUM SERPL-SCNC: 3.6 MMOL/L (ref 3.5–5.1)
PROT SERPL-MCNC: 7.6 G/DL (ref 5.7–8.2)
RBC # BLD AUTO: 4.79 10^6/UL (ref 4–5.2)
SODIUM SERPL-SCNC: 140 MMOL/L (ref 136–145)
WBC # BLD AUTO: 8 10^3/UL (ref 4.4–10.8)

## 2025-06-28 PROCEDURE — 36600 WITHDRAWAL OF ARTERIAL BLOOD: CPT

## 2025-06-28 PROCEDURE — 82805 BLOOD GASES W/O2 SATURATION: CPT

## 2025-06-28 PROCEDURE — 36415 COLL VENOUS BLD VENIPUNCTURE: CPT

## 2025-06-28 PROCEDURE — 80053 COMPREHEN METABOLIC PANEL: CPT

## 2025-06-28 PROCEDURE — 84484 ASSAY OF TROPONIN QUANT: CPT

## 2025-06-28 PROCEDURE — 85025 COMPLETE CBC W/AUTO DIFF WBC: CPT

## 2025-06-28 RX ADMIN — IPRATROPIUM BROMIDE ONE MG: 0.5 SOLUTION RESPIRATORY (INHALATION) at 18:25

## 2025-06-28 RX ADMIN — ALBUTEROL SULFATE ONE MG: 2.5 SOLUTION RESPIRATORY (INHALATION) at 18:25
